# Patient Record
Sex: MALE | Race: WHITE | NOT HISPANIC OR LATINO | Employment: UNEMPLOYED | URBAN - METROPOLITAN AREA
[De-identification: names, ages, dates, MRNs, and addresses within clinical notes are randomized per-mention and may not be internally consistent; named-entity substitution may affect disease eponyms.]

---

## 2022-09-16 ENCOUNTER — TRANSFERRED RECORDS (OUTPATIENT)
Dept: HEALTH INFORMATION MANAGEMENT | Facility: CLINIC | Age: 27
End: 2022-09-16

## 2022-09-16 LAB
CREATININE (EXTERNAL): 0.83 MG/DL (ref 0.7–1.2)
GFR ESTIMATED (EXTERNAL): 125 ML/MIN/1.73M2
GLUCOSE (EXTERNAL): 90 MG/DL (ref 70–99)
POTASSIUM (EXTERNAL): 3.7 MEQ/L (ref 3.4–5.1)
TSH SERPL-ACNC: 1.02 UIU/ML (ref 0.4–3.99)

## 2022-09-17 ENCOUNTER — TELEPHONE (OUTPATIENT)
Dept: BEHAVIORAL HEALTH | Facility: CLINIC | Age: 27
End: 2022-09-17

## 2022-09-17 ENCOUNTER — HOSPITAL ENCOUNTER (INPATIENT)
Facility: HOSPITAL | Age: 27
LOS: 4 days | Discharge: HOME | End: 2022-09-21
Attending: STUDENT IN AN ORGANIZED HEALTH CARE EDUCATION/TRAINING PROGRAM | Admitting: STUDENT IN AN ORGANIZED HEALTH CARE EDUCATION/TRAINING PROGRAM
Payer: MEDICAID

## 2022-09-17 DIAGNOSIS — R45.1 AGITATION: ICD-10-CM

## 2022-09-17 DIAGNOSIS — G47.00 INSOMNIA, UNSPECIFIED TYPE: Primary | ICD-10-CM

## 2022-09-17 DIAGNOSIS — F25.0 SCHIZOAFFECTIVE DISORDER, BIPOLAR TYPE (H): ICD-10-CM

## 2022-09-17 DIAGNOSIS — F41.9 ANXIETY: ICD-10-CM

## 2022-09-17 PROBLEM — F25.9 SCHIZOAFFECTIVE DISORDER (H): Status: ACTIVE | Noted: 2022-09-17

## 2022-09-17 PROCEDURE — 124N000001 HC R&B MH

## 2022-09-17 RX ORDER — OLANZAPINE 10 MG/2ML
10 INJECTION, POWDER, FOR SOLUTION INTRAMUSCULAR 3 TIMES DAILY PRN
Status: DISCONTINUED | OUTPATIENT
Start: 2022-09-17 | End: 2022-09-21 | Stop reason: HOSPADM

## 2022-09-17 RX ORDER — LANOLIN ALCOHOL/MO/W.PET/CERES
3 CREAM (GRAM) TOPICAL
Status: DISCONTINUED | OUTPATIENT
Start: 2022-09-17 | End: 2022-09-21 | Stop reason: HOSPADM

## 2022-09-17 RX ORDER — GABAPENTIN 100 MG/1
100 CAPSULE ORAL EVERY 6 HOURS PRN
Status: DISCONTINUED | OUTPATIENT
Start: 2022-09-17 | End: 2022-09-21 | Stop reason: HOSPADM

## 2022-09-17 RX ORDER — OLANZAPINE 10 MG/1
10 TABLET ORAL 3 TIMES DAILY PRN
Status: DISCONTINUED | OUTPATIENT
Start: 2022-09-17 | End: 2022-09-21 | Stop reason: HOSPADM

## 2022-09-17 ASSESSMENT — ACTIVITIES OF DAILY LIVING (ADL): ADLS_ACUITY_SCORE: 45

## 2022-09-18 PROCEDURE — 250N000013 HC RX MED GY IP 250 OP 250 PS 637: Performed by: NURSE PRACTITIONER

## 2022-09-18 PROCEDURE — 99223 1ST HOSP IP/OBS HIGH 75: CPT | Mod: AI | Performed by: NURSE PRACTITIONER

## 2022-09-18 PROCEDURE — 124N000001 HC R&B MH

## 2022-09-18 RX ORDER — NICOTINE 21 MG/24HR
1 PATCH, TRANSDERMAL 24 HOURS TRANSDERMAL DAILY
Status: DISCONTINUED | OUTPATIENT
Start: 2022-09-18 | End: 2022-09-21 | Stop reason: HOSPADM

## 2022-09-18 RX ORDER — POLYETHYLENE GLYCOL 3350 17 G
2 POWDER IN PACKET (EA) ORAL
Status: DISCONTINUED | OUTPATIENT
Start: 2022-09-18 | End: 2022-09-21 | Stop reason: HOSPADM

## 2022-09-18 RX ORDER — ARIPIPRAZOLE 10 MG/1
10 TABLET ORAL DAILY
Status: DISCONTINUED | OUTPATIENT
Start: 2022-09-19 | End: 2022-09-19

## 2022-09-18 RX ORDER — MAGNESIUM HYDROXIDE/ALUMINUM HYDROXICE/SIMETHICONE 120; 1200; 1200 MG/30ML; MG/30ML; MG/30ML
30 SUSPENSION ORAL EVERY 4 HOURS PRN
Status: DISCONTINUED | OUTPATIENT
Start: 2022-09-18 | End: 2022-09-21 | Stop reason: HOSPADM

## 2022-09-18 RX ORDER — ARIPIPRAZOLE 5 MG/1
5 TABLET ORAL DAILY
Status: DISCONTINUED | OUTPATIENT
Start: 2022-09-18 | End: 2022-09-18

## 2022-09-18 RX ORDER — ACETAMINOPHEN 325 MG/1
650 TABLET ORAL EVERY 4 HOURS PRN
Status: DISCONTINUED | OUTPATIENT
Start: 2022-09-18 | End: 2022-09-21 | Stop reason: HOSPADM

## 2022-09-18 RX ADMIN — NICOTINE 1 PATCH: 21 PATCH, EXTENDED RELEASE TRANSDERMAL at 18:00

## 2022-09-18 RX ADMIN — NICOTINE POLACRILEX 2 MG: 2 GUM, CHEWING BUCCAL at 17:36

## 2022-09-18 RX ADMIN — ARIPIPRAZOLE 5 MG: 5 TABLET ORAL at 12:08

## 2022-09-18 RX ADMIN — NICOTINE POLACRILEX 2 MG: 2 GUM, CHEWING BUCCAL at 14:02

## 2022-09-18 RX ADMIN — OLANZAPINE 10 MG: 10 TABLET, FILM COATED ORAL at 15:48

## 2022-09-18 RX ADMIN — OLANZAPINE 10 MG: 10 TABLET, FILM COATED ORAL at 17:35

## 2022-09-18 RX ADMIN — NICOTINE POLACRILEX 2 MG: 2 GUM, CHEWING BUCCAL at 15:45

## 2022-09-18 ASSESSMENT — ACTIVITIES OF DAILY LIVING (ADL)
ADLS_ACUITY_SCORE: 28
TOILETING_ISSUES: NO
ADLS_ACUITY_SCORE: 28
DIFFICULTY_EATING/SWALLOWING: NO
ORAL_HYGIENE: INDEPENDENT
ADLS_ACUITY_SCORE: 28
ADLS_ACUITY_SCORE: 28
CHANGE_IN_FUNCTIONAL_STATUS_SINCE_ONSET_OF_CURRENT_ILLNESS/INJURY: NO
DRESS: SCRUBS (BEHAVIORAL HEALTH)
ADLS_ACUITY_SCORE: 28
DRESSING/BATHING_DIFFICULTY: NO
CONCENTRATING,_REMEMBERING_OR_MAKING_DECISIONS_DIFFICULTY: NO
ADLS_ACUITY_SCORE: 28
LAUNDRY: UNABLE TO COMPLETE
WEAR_GLASSES_OR_BLIND: NO
ADLS_ACUITY_SCORE: 45
FALL_HISTORY_WITHIN_LAST_SIX_MONTHS: NO
DOING_ERRANDS_INDEPENDENTLY_DIFFICULTY: NO
HYGIENE/GROOMING: INDEPENDENT
ADLS_ACUITY_SCORE: 28
WALKING_OR_CLIMBING_STAIRS_DIFFICULTY: NO

## 2022-09-18 NOTE — PLAN OF CARE
Report received.   PT in MHICU requesting Nicotine gum. When we speak he asked me if a lot of people up here hear voices. We get into a long discussion about his voices and he reports they are intrusive at times and rude and interrupt conversations that he is trying to have with real people. He reports that Zyprexa 20mg has helped him in the past and that he would like to try some today. He is friendly, cooperative, polite and calm.   He takes Zyprexa 10mg per request for AH. 1548.  He weaned appropriately out to open unit to make a phone call and have dinner.   He was social in lounge, elated and hyper-verbal but friendly and appropriate.   He requests a second dose of Zyprexa 10mg at 1735 stating the first dose helped the voices get quieter but would like a second dose to see if it could stop them completely for awhile.   He played bored games in the lobby for a short time and requested to go back to his room and went to into bed at 1815.    He also started a nicotine patch this shift.     Face to face end of shift report communicated to oncoming RN     Milena Kolb RN  9/18/2022  9:50 PM                    Problem: Behavioral Health Plan of Care  Goal: Patient-Specific Goal (Individualization)  Description: Patient will sleep 6-8 hours per night.  Patient will eat >75% of meals.   Patient will be open to treatment team recommendations for medicines and aftercare planning.    Weaning Care Plan: (9/17/22) Patient unable to wean at this time. Treatment team to assess daily.  Outcome: Ongoing, Progressing     Problem: Thought Process Alteration  Goal: Optimal Thought Clarity  Description: Patient will have no self harm outbursts while inpatient.  Patient will maintain ADLs without prompting.   Outcome: Ongoing, Progressing   Goal Outcome Evaluation:

## 2022-09-18 NOTE — PLAN OF CARE
Problem: Behavioral Health Plan of Care  Goal: Patient-Specific Goal (Individualization)  Description: Patient will sleep 6-8 hours per night.  Patient will eat >75% of meals.   Patient will be open to treatment team recommendations for medicines and aftercare planning.    Weaning Care Plan: (9/17/22) Patient unable to wean at this time. Treatment team to assess daily.  Outcome: Ongoing, Progressing   Goal Outcome Evaluation:      Face to face shift report received from RN. Rounding completed, pt observed.Client rested in room for 7 hours with eyes closed and respirations noted. Face to face report will be communicated to oncoming RN.    Eros Jackson RN  9/18/2022  5:39 AM

## 2022-09-18 NOTE — PROGRESS NOTES
09/17/22 1255   Patient Belongings   Did you bring any home meds/supplements to the hospital?  No   Patient Belongings remains with patient;locker   Patient Belongings Remaining with Patient body jewelry  (ring is on right hand)   Patient Belongings Put in Hospital Secure Location (Security or Locker, etc.) clothing   Belongings Search Yes   Clothing Search Yes   Second Staff Lupis   Comment pink striped boxers, black shoes, black shirt, navy blue shirt, pair of striped socks, grey sweatshirt, grey shorts, 2 pens, red stick, black cord       List items sent to safe: none  All other belongings put in assigned cubby in belongings room.       I have reviewed my belongings list on admission and verify that it is correct.     Patient signature_______________________________    Second staff witness (if patient unable to sign) ______________________________       I have received all my belongings at discharge.    Patient signature________________________________    Amee   9/17/2022  11:17 PM

## 2022-09-18 NOTE — PLAN OF CARE
"  Problem: Behavioral Health Plan of Care  Goal: Patient-Specific Goal (Individualization)  Description: Patient will sleep 6-8 hours per night.  Patient will eat >75% of meals.   Patient will be open to treatment team recommendations for medicines and aftercare planning.    Weaning Care Plan: (9/17/22) Patient unable to wean at this time. Treatment team to assess daily.    Patient is isolative and withdrawn, spending time in his room in bed. He is angry, irritable, and frustrated. Speech and eye contact are minimal, he is dismissive and short with his answers. States \"I don't want to be here, I never wanted to come to the hospital\". He requested to have his \"medallion necklace, it's for [employer] reasons\", he was told that this was not allowed, but he could receive a photo copy of it, states \"that's okay, just make sure it's in my stuff\". When given his first dose of Abilify, he states \"I need more than that\", referring to the dose of 5 mg. He showered after lunch.   Face to face end of shift report communicated to oncoming RN.     Deborah Gregory RN  9/18/2022  3:00 PM    Outcome: Ongoing, Not Progressing     Problem: Thought Process Alteration  Goal: Optimal Thought Clarity  Description: Patient will have no self harm outbursts while inpatient.  Patient will maintain ADLs without prompting.   Outcome: Ongoing, Not Progressing   Goal Outcome Evaluation:    Plan of Care Reviewed With: patient                 "
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
## 2022-09-18 NOTE — TELEPHONE ENCOUNTER
[facility] 9/17/22 4:54pm    B Pt BIB police with concerns of hearing voices. Pt reporting AH telling him to kill himself and are causing him distress and will likely commit suicide if voices do not stop. Pt denies previous SA or current SI however crisis team reports pt expressed SI with multiple plans. MH DX schizoaffective; prev [facility] stays, most recent 3-4 months ago. Pt reports prev substance use; THC a few days ago, meth and heroin 3-4 months ago and frequent alcohol use to help him fall asleep at night, no withdrawal symptoms. Utox pos THC, negative for everything else. COVID negative.     A PABLO    R Pt in [facility] ER awaiting placement.     8:35pm: On call [place] provider Connie accepts pt for 5S/N.   9pm: Intake informed 5N of pt placement and report time. Charge nurse report [facility] can call for report whenever ready. Intake informed [facility] of pt placement and report time.

## 2022-09-18 NOTE — PLAN OF CARE
ADMISSION NOTE    Reason for admission: erratic behaviors, self harm.  Safety concerns: history of self harm.  Risk for or history of violence: unknown at this time.   Full skin assessment: completed. No open areas. Tra    Patient arrived on unit from [facility] ED accompanied by ambulance crew and security on 9/17/2022  10:50PM.   Status on arrival: cooperative but sarcastic, disheveled, hungry, affect bright, smiling.  /89   Pulse 99   Temp 98.6  F (37  C) (Temporal)   Resp 16   SpO2 97%   Patient given tour of unit and Welcome to  unit papers given to patient, wanding completed, belongings inventoried, and admission assessment completed.   Patient's legal status on arrival is unknown at this time. Needs to be verified.  Patient Bill of Rights discussed with and copy given to patient.   Patient denies SI, HI, and thoughts of self harm and contracts for safety while on unit.      Maicol Sainz RN  9/17/2022  11:41 PM       Goal Outcome Evaluation:      Problem: Behavioral Health Plan of Care  Goal: Patient-Specific Goal (Individualization)  Description: Patient will sleep 6-8 hours per night.  Patient will eat >75% of meals.   Patient will be open to treatment team recommendations for medicines and aftercare planning.    Weaning Care Plan: (9/17/22) Patient unable to wean at this time. Treatment team to assess daily.  Outcome: Ongoing, Not Progressing     Problem: Thought Process Alteration  Goal: Optimal Thought Clarity  Description: Patient will have no self harm outbursts while inpatient.  Patient will maintain ADLs without prompting.   Outcome: Ongoing, Not Progressing

## 2022-09-19 PROCEDURE — 250N000013 HC RX MED GY IP 250 OP 250 PS 637: Performed by: NURSE PRACTITIONER

## 2022-09-19 PROCEDURE — 250N000011 HC RX IP 250 OP 636: Performed by: NURSE PRACTITIONER

## 2022-09-19 PROCEDURE — 124N000001 HC R&B MH

## 2022-09-19 PROCEDURE — 99232 SBSQ HOSP IP/OBS MODERATE 35: CPT | Performed by: NURSE PRACTITIONER

## 2022-09-19 RX ORDER — LORAZEPAM 2 MG/ML
1 INJECTION INTRAMUSCULAR EVERY 4 HOURS PRN
Status: DISCONTINUED | OUTPATIENT
Start: 2022-09-19 | End: 2022-09-21 | Stop reason: HOSPADM

## 2022-09-19 RX ORDER — ARIPIPRAZOLE 15 MG/1
15 TABLET ORAL DAILY
Status: DISCONTINUED | OUTPATIENT
Start: 2022-09-20 | End: 2022-09-21 | Stop reason: HOSPADM

## 2022-09-19 RX ORDER — LORAZEPAM 1 MG/1
1 TABLET ORAL EVERY 4 HOURS PRN
Status: DISCONTINUED | OUTPATIENT
Start: 2022-09-19 | End: 2022-09-21 | Stop reason: HOSPADM

## 2022-09-19 RX ADMIN — NICOTINE POLACRILEX 2 MG: 2 LOZENGE ORAL at 12:56

## 2022-09-19 RX ADMIN — ARIPIPRAZOLE 10 MG: 10 TABLET ORAL at 08:08

## 2022-09-19 RX ADMIN — OLANZAPINE 10 MG: 10 INJECTION, POWDER, LYOPHILIZED, FOR SOLUTION INTRAMUSCULAR at 14:06

## 2022-09-19 RX ADMIN — GABAPENTIN 100 MG: 100 CAPSULE ORAL at 13:07

## 2022-09-19 RX ADMIN — NICOTINE POLACRILEX 2 MG: 2 LOZENGE ORAL at 17:32

## 2022-09-19 RX ADMIN — NICOTINE POLACRILEX 2 MG: 2 LOZENGE ORAL at 09:08

## 2022-09-19 RX ADMIN — NICOTINE 1 PATCH: 21 PATCH, EXTENDED RELEASE TRANSDERMAL at 08:09

## 2022-09-19 RX ADMIN — OLANZAPINE 10 MG: 10 TABLET, FILM COATED ORAL at 17:33

## 2022-09-19 RX ADMIN — LORAZEPAM 1 MG: 2 INJECTION, SOLUTION INTRAMUSCULAR; INTRAVENOUS at 18:04

## 2022-09-19 RX ADMIN — GABAPENTIN 100 MG: 100 CAPSULE ORAL at 20:38

## 2022-09-19 RX ADMIN — NICOTINE POLACRILEX 2 MG: 2 LOZENGE ORAL at 21:21

## 2022-09-19 RX ADMIN — OLANZAPINE 10 MG: 10 TABLET, FILM COATED ORAL at 07:54

## 2022-09-19 ASSESSMENT — ACTIVITIES OF DAILY LIVING (ADL)
ADLS_ACUITY_SCORE: 28
ADLS_ACUITY_SCORE: 28
LAUNDRY: UNABLE TO COMPLETE
ADLS_ACUITY_SCORE: 28
ADLS_ACUITY_SCORE: 28
HYGIENE/GROOMING: INDEPENDENT
ORAL_HYGIENE: INDEPENDENT
ADLS_ACUITY_SCORE: 28
DRESS: SCRUBS (BEHAVIORAL HEALTH);INDEPENDENT
ADLS_ACUITY_SCORE: 28

## 2022-09-19 NOTE — PROGRESS NOTES
"[facility] PSYCHIATRY  PROGRESS NOTE     SUBJECTIVE     Anand is weaning out of the MHICU when I see him today. He reports that his mood is \"stellar\" today. Mood seems elated, speech a bit loud at times. When speaking with me he reports that the \"voices are better\", though 10 minutes later tells his nurse that they are \"worse\" and is requesting PRN medications. He denies any suicidal thoughts. He reports that he slept well last night. He has been attending some of the group programming. Agreeable to increase in Abilify today.       MEDICATIONS   Scheduled Meds:    [START ON 9/20/2022] ARIPiprazole  15 mg Oral Daily     nicotine  1 patch Transdermal Daily     nicotine   Transdermal Q8H     PRN Meds:.acetaminophen, alum & mag hydroxide-simethicone, gabapentin, melatonin, nicotine, OLANZapine **OR** OLANZapine     ALLERGIES   No Known Allergies     MENTAL STATUS EXAM   Vitals: /86   Pulse 109   Temp 97.8  F (36.6  C) (Temporal)   Resp 18   SpO2 98%     Appearance:  awake, alert, dressed in hospital scrubs, appeared as age stated and slightly unkempt  Attitude:  cooperative  Eye Contact:  good  Mood:  Elated, \"stellar\"  Affect:  mood congruent  Speech:  clear, coherent, loud at times  Psychomotor Behavior:  no evidence of tardive dyskinesia, dystonia, or tics  Thought Process:  linear and goal oriented  Associations:  no loose associations  Thought Content:  no evidence of suicidal ideation or homicidal ideation and auditory hallucinations present  Insight:  limited  Judgment:  limited  Oriented to:  time, person, and place  Attention Span and Concentration:  fair  Recent and Remote Memory:  intact  Language: Able to name objects, Able to repeat phrases and Able to read and write  Fund of Knowledge: appropriate  Muscle Strength and Tone: normal  Gait and Station: Normal       LABS   No results found for this or any previous visit (from the past 24 hour(s)).      IMPRESSION     This is a 26 year old " male with a PMH of schizoaffective disorder, polysubstance abuse, ADHD, unspecified developmental delay, and mild cognitive impairment who presented via law enforcement to the [facility] ED for evaluation of suicidal ideation and hallucinations. He reportedly had been making statements about command hallucinations telling him to kill himself. He indicated that they had been getting worse over the past 3 months. He does not take any medications though does admit to frequent use of cannabis and alcohol, last use of meth and heroin about 3 months ago. Today he openly admits that the voices have been bad, though denies that he made any suicidal statements or that they are telling him to kill himself. He is irritable that he is in the hospital, though has remained cooperative. He offers very little information. He is willing to start medication, as long as it is not Haldol or Risperdal which he reports having side effects from. Discussed Abilify, which he is agreeable to take after reviewing risks, benefits, and side effects. Discussed that his ongoing substance use will continue to complicate the clinical picture until he is able to maintain a prolonged period of sobriety. He does report seeing someone at [facility], though he is unable to identify who he sees or why he is seeing them. Will need to clarify and get him follow up scheduled prior to discharge.       DIAGNOSES     Unspecified psychotic disorder, r/o substance-induced psychosis vs schizoaffective (by history)  Cannabis use disorder, severe  History of polysubstance abuse- opioid (heroin) and stimulant (meth) use disorder- in partial remission  Alcohol use disorder       PLAN     Location: Unit 5  Legal Status: Orders Placed This Encounter      Emergency Hospitalization Hold (72 Hr Hold)    Safety Assessment:    Behavioral Orders   Procedures     Code 1 - Restrict to Unit     Routine Programming     As clinically indicated     Status 15      Every 15 minutes.      PTA medications continued/changed:     -No PTA meds    New medications tried and stopped:     -None    New medications initiated:     -Abilify 5 mg 9/18, increase to 10 mg on 9/19, 15 mg on 9/20    Today's Changes:    -Increase Abilify    Programming: Patient will be treated in a therapeutic milieu with appropriate individual and group therapies. Education will be provided on diagnoses, medications, and treatments.     Medical diagnoses:  Per medicine    Consult: None  Tests: None    Anticipated LOS: 3-5 days  Disposition: home with outpatient services       TREATMENT TEAM CARE PLAN     Progress: Continued symptoms. Elevated mood    Continued Stay Criteria/Rationale: Continued symptoms without sufficient improvement/resolution. Increase Abilify    Medical/Physical: See above.    Precautions: See above.     Plan: Continue inpatient care with unit support and medication management.    Rationale for change in precautions or plan: NA due to no change.    Participants: Mariah Toribio NP, Nursing, SW, OT.    The patient's care was discussed with the treatment team and chart notes were reviewed.       ATTESTATION      Mariah Toribio NP

## 2022-09-19 NOTE — DISCHARGE INSTRUCTIONS
Behavioral Discharge Planning and Instructions    Summary: This is a 26 year old male who presented to the [facility] ED via law enforcement for evaluation of auditory hallucinations and SI.     Main Diagnosis: SI    Health Care Follow-up:     [facility]   [address] [place] [address]   Phone: [phone]     [facility]  [address] [facility], [id number]  [phone]  Fax [phone]     [facility]  [address] [place] [address]  Phone: [phone] Fax: [phone]     NUMBERS TO CALL IN CRISIS    National Suicide Prevention Lifeline ([facility])  1-150.215.7755    Crisis Text Line ([place])  Text  HOME  to 637951    Mental Health Crisis Line ([place], [place])  186.766.4754    [facility] Mobile Crisis ([place], [place])   [phone]      If you are feeling very unsafe, call 911 or bring yourself to the Emergency Room        Counseling in [place]:  Benji Burns           [phone]  [facility]    [facility]      [phone]  [facility]     [facility] (young kids)    [phone]   Jazlyn López [facility] (older kids & adults)  [phone]   Dusty Luciano [facility]      [phone]  [facility]       [phone]   Moiz Oliver MS, LP   Kerry Graham MA, [facility]   Luigi Church MS psychotherapist   Fior Voss MS, [facility]   Diana Armenta [facility], counselor   Ute Oliver [facility], counselor  Damaris        [phone]  Enrique Gunn, counseling  Dr Ann Campa, psychiatry  Asmita Goodman, counseling  Familia Garcia, counseling  Sohail Fonseca, counseling  Connie Shelton, psychiatry   [facility]       [phone]   Mere Patterson MA, LMFT, [facility]   Ameena Spencer MSW, [facility]          [phone]  [facility]      [phone]   Ute Tolentino MS, [facility]          [phone]        J Luis Cooper MSW, [facility] , C-MARTIR Morales  MSW, LGSW                                                    Jose Vences [facility]      Lydia Gary MS, LPC   Nilam Leaders   [facility]                [phone]      Trish Hernandes PsyD     Cyndee DonahueyD, owner      Yessi Dante PsyD    Michoacano Martinez MPAS, WILFREDO Marcus PhD   Vilma Monteiro MSW, LICSW [facility]       [phone]   Kelsi [facility]   Moy Vega APRN, CNP,     Jordan Bullard MSW, [facility] (adolescents)   Jackie Grinnel APRN, CNP (Hib via telehealth; adolescents)   Laura AMIN, CNP (Hib via telehealth)   Elian Mckeno MA, LPC, BCIA (Hib via telehealth)   Dorina Yancey [facility] MSW, SW   Maritza Patel [facility]   Huber Estes DNP, APRN, CNP   Silvana Hernandez RN, BSN   Danna Coburn RN-BC, BSN (Hib via telehealth)  [facility]         [phone]   Jackelyn Rowe, MSN, PMHNP-BC  [facility]           [phone]   Jaya Arnold MA, LMFT   Kayli Valerio MS RN PM NP   Ute Vargas, [facility]         [phone]  [facility]       [phone]   [facility]   Magda Godoy (to be LPC)   Escobar Real (to be [facility])      Counseling in [place]:  [facility]       [phone]   mental health & CD counseling  Elian Schilling       [phone]  [facility]       [phone]  [facility]        [phone]   Mere Morton  [facility]       [employer] [facility] [facility]              [phone]   can do weekends and evenings   DeLyesenia Oneil, MSW, LIC, LCSW, CCTP    Ben Salcedo MA, LMFT, [facility]  Ravenna Blue Laura       evenings, weekends  [phone]      Counseling in [place]:  [facility]         [phone]   Jackelyn Rowe, MSN, PMHNP-BC   Dottie Montoya MA, [facility]  [facility]      [phone]  [facility]       [phone]   Ana Cristina Wiggins [facility]  [facility]       Brandi  "Jewell       [phone]  [facility]    [address]ally[address]     [facility]      [phone]  [facility]     Familia Cohen MS Ed, [facility]    [phone]    (kids) denotes providers who also see kids   Attend all scheduled appointments with your outpatient providers. Call at least 24 hours in advance if you need to reschedule an appointment to ensure continued access to your outpatient providers.     Major Treatments, Procedures and Findings:  You were provided with: a psychiatric assessment, assessed for medical stability, medication evaluation and/or management, group therapy, family therapy, individual therapy, CD evaluation/assessment, milieu management, and medical interventions    Symptoms to Report: feeling more aggressive, increased confusion, losing more sleep, mood getting worse, or thoughts of suicide    Early warning signs can include: increased depression or anxiety sleep disturbances increased thoughts or behaviors of suicide or self-harm  increased unusual thinking, such as paranoia or hearing voices        Resources:   Crisis Intervention: 879.182.7865 or 349-576-0676 (TTY: 993.127.1002).  Call anytime for help.  National [place] on Mental Illness (www.mn.ivania.org): 151.320.5369 or 758-714-0893.  [place] Association for Children's Mental Health (www.macmh.org): 492.414.5002.  Alcoholics Anonymous (www.alcoholics-anonymous.org): Check your phone book for your local chapter.  Suicide Awareness Voices of Education (SAVE) (www.save.org): 408-579-SBMS (4999)  National Suicide Prevention Line (www.mentalhealthmn.org): 407-892-IXNK (5589)  Mental Health Consumer/Survivor Network of [place] (www.mhcsn.net): 997.471.6175 or 146-536-0608  Mental Health Association of [place] (www.mentalhealth.org): 350.219.7310 or 009-541-6724  Self- Management and Recovery Training., SMART-- Toll free: 887.214.5153  www.ipDatatel.Try The World  Text 4 Life: txt \"LIFE\" to 28275 for immediate support and crisis " "intervention  Crisis text line: Text \"[place]\" to 470247. Free, confidential, 24/7.  Crisis Intervention: 771.717.8486 or 072-974-2618. Call anytime for help.     General Medication Instructions:   See your medication sheet(s) for instructions.   Take all medicines as directed.  Make no changes unless your doctor suggests them.   Go to all your doctor visits.  Be sure to have all your required lab tests. This way, your medicines can be refilled on time.  Do not use any drugs not prescribed by your doctor.  Avoid alcohol.    Advance Directives:   Scanned document on file with [facility]? No scanned doc  Is document scanned? Pt states no documents  Honoring Choices Your Rights Handout: Informed and given  Was more information offered? Pt declined    The Treatment team has appreciated the opportunity to work with you. If you have any questions or concerns about your recent admission, you can contact the unit which can receive your call 24 hours a day, 7 days a week. They will be able to get in touch with a Provider if needed. The unit number is [phone] .    [place] Area:  [facility], Crisis stabilization Hospitals in [place]- [phone]  Pending sale to [facility] Crisis Line: 1-484.471.4612  [facility] For Family [facility]: 521-1511  Sexual Assault Program [facility]: 366.539.4449 or 1-989.324.2198  [facility] Battered Women's Program: 7-960-708-6102 Ext: 279       Calls answered Mon-Fri-8:00 am--4:30 pm    [place]:  [facility]: 9-891-864-5946  [facility] - 2-313-674-1579  [facility] first call for help: 0-147-784-3392  [facility]:  (382) [phone]    [place] Area:  Warm Line: 1-108.627.3085       Calls answered Tuesday--Saturday 4:00 pm--10:00 pm  [place] Crisis Line - 854.852.5409  [facility] 369-211-7823    [place] Statewide:  [place] Crisis and Referral Services: 1-126.177.4436  National Suicide Prevention Lifeline: 5-192-965-TALK (1749)   - hbv1hqhe- Text \"Life\" to " 96081  First Call for Help: 2-1-1  [place] Helpline- 0-640-FVOQ-HELP   Crisis Text Line: Text  [place]  to 998400
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
## 2022-09-19 NOTE — PLAN OF CARE
Problem: Behavioral Health Plan of Care  Goal: Plan of Care Review  Recent Flowsheet Documentation  Taken 9/19/2022 1000 by Mike Douglas RN  Plan of Care Reviewed With: patient  Patient Agreement with Plan of Care: agrees  Goal: Patient-Specific Goal (Individualization)  Description: Patient will sleep 6-8 hours per night.  Patient will eat >75% of meals.   Patient will be open to treatment team recommendations for medicines and aftercare planning.    Weaning Care Plan: Patient is able to wean to the open unit as long as his behavior remains appropriate. Treatment team to assess daily.  Outcome: Ongoing, Progressing  Note: Patient is up on the unit. He is maintaining appropriate boundaries with staff and peers. He admits to feeling anxious. He has auditory hallucinations that are very intrusive. He took zyprexa 10 mg orally at  0754 for increased hallucinations. He weaned to the open unit and then his room was moved to the open unit. He is agreeable to safe behavior. He attended groups.   Goal: Optimal Comfort and Wellbeing  Intervention: Provide Person-Centered Care  Recent Flowsheet Documentation  Taken 9/19/2022 1000 by Mike Douglas RN  Trust Relationship/Rapport:   questions answered   care explained   questions encouraged   reassurance provided   thoughts/feelings acknowledged  Goal: Develops/Participates in Therapeutic [place] to Support Successful Transition  Intervention: Foster Therapeutic [place]  Recent Flowsheet Documentation  Taken 9/19/2022 1000 by Mike Douglas RN  Trust Relationship/Rapport:   questions answered   care explained   questions encouraged   reassurance provided   thoughts/feelings acknowledged     Problem: Thought Process Alteration  Goal: Optimal Thought Clarity  Description: Patient will have no self harm outbursts while inpatient.  Patient will maintain ADLs without prompting.   Outcome: Ongoing, Progressing  Note: He continues with intrusive hallucinations. He received zyprexa  10 mg IM at 1406. He is spending time in the lounge and maintains appropriate boundaries.

## 2022-09-19 NOTE — PLAN OF CARE
Problem: Behavioral Health Plan of Care  Goal: Patient-Specific Goal (Individualization)  Description: Patient will sleep 6-8 hours per night.  Patient will eat >75% of meals.   Patient will be open to treatment team recommendations for medicines and aftercare planning.    Weaning Care Plan: (9/17/22) Patient unable to wean at this time. Treatment team to assess daily.  Outcome: Ongoing, Progressing   Goal Outcome Evaluation:      Face to face shift report received from RN. Rounding completed, pt observed.Client rested in room for 7 hours with eyes closed and respirations noted.Face to face report will be communicated to oncoming RN.    Eros Jackson RN  9/19/2022  6:31 AM

## 2022-09-19 NOTE — CARE PLAN
Nursing completed review with patient who reported no medications PTA. Will not be speaking with patient unless otherwise requested.     Unable to find any conflicting data from any available outside sources. Pt likely not on any prescribed medications at this time.     Will remain available if anything arises regarding PTA medications.    Carmen Leija on 9/19/2022 at 12:04 PM

## 2022-09-20 VITALS
TEMPERATURE: 98.4 F | SYSTOLIC BLOOD PRESSURE: 139 MMHG | RESPIRATION RATE: 16 BRPM | OXYGEN SATURATION: 98 % | HEART RATE: 108 BPM | DIASTOLIC BLOOD PRESSURE: 82 MMHG

## 2022-09-20 PROCEDURE — 250N000013 HC RX MED GY IP 250 OP 250 PS 637: Performed by: NURSE PRACTITIONER

## 2022-09-20 PROCEDURE — 124N000001 HC R&B MH

## 2022-09-20 PROCEDURE — 99232 SBSQ HOSP IP/OBS MODERATE 35: CPT | Performed by: NURSE PRACTITIONER

## 2022-09-20 RX ADMIN — OLANZAPINE 10 MG: 10 TABLET, FILM COATED ORAL at 18:17

## 2022-09-20 RX ADMIN — NICOTINE POLACRILEX 2 MG: 2 LOZENGE ORAL at 16:06

## 2022-09-20 RX ADMIN — NICOTINE 1 PATCH: 21 PATCH, EXTENDED RELEASE TRANSDERMAL at 08:35

## 2022-09-20 RX ADMIN — NICOTINE POLACRILEX 2 MG: 2 LOZENGE ORAL at 18:18

## 2022-09-20 RX ADMIN — NICOTINE POLACRILEX 2 MG: 2 LOZENGE ORAL at 19:30

## 2022-09-20 RX ADMIN — OLANZAPINE 10 MG: 10 TABLET, FILM COATED ORAL at 16:06

## 2022-09-20 RX ADMIN — MELATONIN 3 MG: 3 TAB ORAL at 20:36

## 2022-09-20 RX ADMIN — GABAPENTIN 100 MG: 100 CAPSULE ORAL at 20:36

## 2022-09-20 RX ADMIN — OLANZAPINE 10 MG: 10 TABLET, FILM COATED ORAL at 08:35

## 2022-09-20 RX ADMIN — ARIPIPRAZOLE 15 MG: 15 TABLET ORAL at 08:35

## 2022-09-20 ASSESSMENT — ACTIVITIES OF DAILY LIVING (ADL)
ADLS_ACUITY_SCORE: 28
ORAL_HYGIENE: INDEPENDENT
DRESS: INDEPENDENT;SCRUBS (BEHAVIORAL HEALTH)
ADLS_ACUITY_SCORE: 28
HYGIENE/GROOMING: INDEPENDENT
ADLS_ACUITY_SCORE: 28
LAUNDRY: UNABLE TO COMPLETE
ADLS_ACUITY_SCORE: 28

## 2022-09-20 NOTE — PROGRESS NOTES
"[facility] PSYCHIATRY  PROGRESS NOTE     SUBJECTIVE     Anand is resting in bed when I see him today. He reports feeling \"tired\" today. When asked about the auditory hallucinations he indicates that they are \"not too bad\" today. He denies any suicidal thoughts. He does appear calmer today, mood not as elevated. Denies any side effects from medications. He reports that he slept \"okay\" last night and just wants to sleep now. He notes that he plans on going to stay with his grandmother on discharge, states he has been staying with her off and on over the last month though needs to work on finding his own place. Will likely discharge when hold expires to follow up with his outpatient providers at [facility].        MEDICATIONS   Scheduled Meds:    ARIPiprazole  15 mg Oral Daily     nicotine  1 patch Transdermal Daily     nicotine   Transdermal Q8H     PRN Meds:.acetaminophen, alum & mag hydroxide-simethicone, gabapentin, LORazepam **OR** LORazepam, melatonin, nicotine, OLANZapine **OR** OLANZapine     ALLERGIES   No Known Allergies     MENTAL STATUS EXAM   Vitals: /85   Pulse 95   Temp 98.5  F (36.9  C) (Tympanic)   Resp 16   SpO2 98%     Appearance:  awake, alert, dressed in hospital scrubs, appeared as age stated and slightly unkempt  Attitude:  cooperative  Eye Contact:  good  Mood: calmer, less elevated today  Affect:  mood congruent  Speech:  clear, coherent  Psychomotor Behavior:  no evidence of tardive dyskinesia, dystonia, or tics  Thought Process:  linear and goal oriented  Associations:  no loose associations  Thought Content: denies any SI, reports that auditory hallucinations are \"not too bad\"  Insight:  limited  Judgment:  limited  Oriented to:  time, person, and place  Attention Span and Concentration:  fair  Recent and Remote Memory:  intact  Language: Able to name objects, Able to repeat phrases and Able to read and write  Fund of Knowledge: appropriate for education  Muscle " Strength and Tone: normal  Gait and Station: Normal       LABS   No results found for this or any previous visit (from the past 24 hour(s)).      IMPRESSION     This is a 26 year old male with a PMH of schizoaffective disorder, polysubstance abuse, ADHD, unspecified developmental delay, and mild cognitive impairment who presented via law enforcement to the [facility] ED for evaluation of suicidal ideation and hallucinations. He reportedly had been making statements about command hallucinations telling him to kill himself. He indicated that they had been getting worse over the past 3 months. He does not take any medications though does admit to frequent use of cannabis and alcohol, last use of meth and heroin about 3 months ago. Today he openly admits that the voices have been bad, though denies that he made any suicidal statements or that they are telling him to kill himself. He is irritable that he is in the hospital, though has remained cooperative. He offers very little information. He is willing to start medication, as long as it is not Haldol or Risperdal which he reports having side effects from. Discussed Abilify, which he is agreeable to take after reviewing risks, benefits, and side effects. Discussed that his ongoing substance use will continue to complicate the clinical picture until he is able to maintain a prolonged period of sobriety. He does report seeing someone at [facility], though he is unable to identify who he sees or why he is seeing them. Will need to clarify and get him follow up scheduled prior to discharge.       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      PTA medications continued/changed:     -No PTA meds    New medications tried and stopped:     -None    New medications initiated:     -Abilify 5 mg 9/18, increase to 10 mg on 9/19, 15 mg on 9/20      Today's Changes:  -Continue Abilify      Programming: Patient will be treated in a therapeutic milieu with appropriate individual and group therapies. Education will be provided on diagnoses, medications, and treatments.     Medical diagnoses:  Per medicine    Consult: None  Tests: None    Anticipated LOS: 3-5 days. Likely discharge home when hold expires  Disposition: home with outpatient services- [facility]       ATTESTATION      Mariah oTribio NP

## 2022-09-20 NOTE — PLAN OF CARE
"SHIFT SUMMARY:  PRN IM ativan 1 mg in the right deltoid related to agitation/anxiety from hearing voices - \"sick shit, I don't even wanna talk about it.\" Attended group and painted. Full-range affect. Pressured speech. Compliant with medication administration and asks for PRNs appropriately.  Weaning to the open unit. Denies suicidal ideation and pain.      Problem: Thought Process Alteration  Goal: Optimal Thought Clarity  Description: Patient will have no self harm outbursts while inpatient.  Patient will maintain ADLs without prompting.   Outcome: Ongoing, Not Progressing     Problem: Behavioral Health Plan of Care  Goal: Patient-Specific Goal (Individualization)  Description: Patient will sleep 6-8 hours per night.  Patient will eat >75% of meals.   Patient will be open to treatment team recommendations for medicines and aftercare planning.    Weaning Care Plan: Patient is able to wean to the open unit as long as his behavior remains appropriate. Treatment team to assess daily.  Outcome: Ongoing, Progressing  Goal: Absence of New-Onset Illness or Injury  Outcome: Ongoing, Progressing   Goal Outcome Evaluation:                      "

## 2022-09-20 NOTE — PLAN OF CARE
Face to face shift report received from YOLANDA Cooney.       Problem: Behavioral Health Plan of Care  Goal: Patient-Specific Goal (Individualization)  Description: Patient will sleep 6-8 hours per night.  Patient will eat >75% of meals.   Patient will be open to treatment team recommendations for medicines and aftercare planning.    Weaning Care Plan: Patient is able to wean to the open unit as long as his behavior remains appropriate. Treatment team to assess daily.  Outcome: Ongoing, Not Progressing   Room remains in MHICU due to floor needs. Weaned to open unit this shift without issue. Calm and cooperative. Medication compliant. Reports auditory hallucinations, but declines to describe these. Received Zyprexa 10mg PO with AM medications. Animated but pleasant during conversation. Withdrawn to bed majority of shift. Requested items for a shower, but then did not shower. No reports of pain.     Problem: Thought Process Alteration  Goal: Optimal Thought Clarity  Description: Patient will have no self harm outbursts while inpatient.  Patient will maintain ADLs without prompting.   Outcome: Ongoing, Not Progressing         Face to face end of shift report to be communicated to oncoming RN.

## 2022-09-20 NOTE — PLAN OF CARE
Problem: Behavioral Health Plan of Care  Goal: Patient-Specific Goal (Individualization)  Description: Patient will sleep 6-8 hours per night.  Patient will eat >75% of meals.   Patient will be open to treatment team recommendations for medicines and aftercare planning.    Weaning Care Plan: Patient is able to wean to the open unit as long as his behavior remains appropriate. Treatment team to assess daily.  Outcome: Ongoing, Progressing   Goal Outcome Evaluation:      Face to face shift report received from RN. Rounding completed, pt observed.Client rested in room for 6 hours with eyes closed and respirations noted.Face to face report will be communicated to oncoming RN.    Eros Jackson RN  9/20/2022  6:51 AM

## 2022-09-21 PROCEDURE — 250N000013 HC RX MED GY IP 250 OP 250 PS 637: Performed by: NURSE PRACTITIONER

## 2022-09-21 PROCEDURE — 99239 HOSP IP/OBS DSCHRG MGMT >30: CPT | Performed by: NURSE PRACTITIONER

## 2022-09-21 RX ORDER — ARIPIPRAZOLE 15 MG/1
15 TABLET ORAL DAILY
Qty: 30 TABLET | Refills: 1 | Status: SHIPPED | OUTPATIENT
Start: 2022-09-22

## 2022-09-21 RX ADMIN — ARIPIPRAZOLE 15 MG: 15 TABLET ORAL at 08:20

## 2022-09-21 RX ADMIN — NICOTINE 1 PATCH: 21 PATCH, EXTENDED RELEASE TRANSDERMAL at 08:20

## 2022-09-21 RX ADMIN — OLANZAPINE 10 MG: 10 TABLET, FILM COATED ORAL at 11:15

## 2022-09-21 RX ADMIN — NICOTINE POLACRILEX 2 MG: 2 LOZENGE ORAL at 14:41

## 2022-09-21 ASSESSMENT — ACTIVITIES OF DAILY LIVING (ADL)
ADLS_ACUITY_SCORE: 28

## 2022-09-21 NOTE — PROGRESS NOTES
Pt is discharging at the recommendation of the treatment team. Pt is discharging to home transported by his friend. Pt denies having any thoughts of hurting themself or anyone else. Pt denies anxiety or depression. Due to the patient not having active health insurance, [facility] provided the patient with outpatient resources that include PCP, medication management, and therapy. Discharge instructions, including; demographic sheet, psychiatric evaluation, discharge summary, and AVS were faxed to these next level of care providers.    SW spoke to the patient and explained the importance of following up with patient financial in regards to their health insurance and to schedule appointments.

## 2022-09-21 NOTE — PLAN OF CARE
"SHIFT SUMMARY:   Weaning from the MHICU the majority of the shift. Denies suicidal ideation. Experiences auditory hallucinations, but will not describe what they are saying beyond \"sick shit\". Eating at least 75% of meals, following treatment team recommendation, no self-harm, and maintaining ADLs. Maintains appropriate boundaries with staff and peers. Flat affect.     PRNs:  PO zyprexa 10 mg related to agitation from auditory hallucinations. PO gabapentin and melatonin administered at 20:36 related to anxiety and sleep promotion.      Problem: Behavioral Health Plan of Care  Goal: Patient-Specific Goal (Individualization)  Description: Patient will sleep 6-8 hours per night.  Patient will eat >75% of meals.   Patient will be open to treatment team recommendations for medicines and aftercare planning.    Weaning Care Plan: Patient is able to wean to the open unit as long as his behavior remains appropriate. Treatment team to assess daily.  Outcome: Ongoing, Progressing  Goal: Absence of New-Onset Illness or Injury  Outcome: Ongoing, Progressing     Problem: Thought Process Alteration  Goal: Optimal Thought Clarity  Description: Patient will have no self harm outbursts while inpatient.  Patient will maintain ADLs without prompting.   Outcome: Ongoing, Progressing   Goal Outcome Evaluation:                      "

## 2022-09-21 NOTE — PROGRESS NOTES
Discharge Note    Patient Discharged to home on 9/21/2022 3:08 PM via ambulation to friends house accompanied by staff to exit hospital.     Patient informed of discharge instructions in AVS. patient verbalizes understanding and denies having any questions pertaining to AVS. Patient stable at time of discharge. Patient denies SI, HI, and thoughts of self harm at time of discharge. All personal belongings returned to patient. Discharge prescriptions sent to [facility]  via electronic communication. Psych evaluation, history and physical, AVS, and discharge summary available to next level of care.     Mike Douglas RN  9/21/2022  3:08 PM

## 2022-09-21 NOTE — PLAN OF CARE
Problem: Behavioral Health Plan of Care  Goal: Plan of Care Review  Recent Flowsheet Documentation  Taken 9/21/2022 1000 by Mike Douglas RN  Plan of Care Reviewed With: patient  Patient Agreement with Plan of Care: agrees  Goal: Patient-Specific Goal (Individualization)  Description: Patient will sleep 6-8 hours per night.  Patient will eat >75% of meals.   Patient will be open to treatment team recommendations for medicines and aftercare planning.    Weaning Care Plan: Patient is able to wean to the open unit as long as his behavior remains appropriate. Treatment team to assess daily.  Outcome: Ongoing, Progressing  Note: He is anticipating discharge this afternoon. He is anxious. He continues with auditory hallucinations.  He is maintaining appropriate boundaries with staff and peers. He is taking medications as prescribed.    Goal: Optimal Comfort and Wellbeing  Intervention: Provide Person-Centered Care  Recent Flowsheet Documentation  Taken 9/21/2022 1000 by Mike Douglas RN  Trust Relationship/Rapport:   care explained   questions answered   questions encouraged   reassurance provided   thoughts/feelings acknowledged  Goal: Develops/Participates in Therapeutic [place] to Support Successful Transition  Intervention: Foster Therapeutic [place]  Recent Flowsheet Documentation  Taken 9/21/2022 1000 by Mike Douglas RN  Trust Relationship/Rapport:   care explained   questions answered   questions encouraged   reassurance provided   thoughts/feelings acknowledged     Problem: Thought Process Alteration  Goal: Optimal Thought Clarity  Description: Patient will have no self harm outbursts while inpatient.  Patient will maintain ADLs without prompting.   Outcome: Ongoing, Progressing  Note: He continues with auditory hallucinations. He requested and received zyprexa 10 mg at 1115 with good results.

## 2022-09-21 NOTE — PLAN OF CARE
Problem: Behavioral Health Plan of Care  Goal: Patient-Specific Goal (Individualization)  Description: Patient will sleep 6-8 hours per night.  Patient will eat >75% of meals.   Patient will be open to treatment team recommendations for medicines and aftercare planning.    Weaning Care Plan: Patient is able to wean to the open unit as long as his behavior remains appropriate. Treatment team to assess daily.  Outcome: Ongoing, Progressing   Goal Outcome Evaluation:      Face to face shift report received from RN. Rounding completed, pt observed.Client rested in room for 6 hours with eyes closed and respirations noted.Face to face report will be communicated to oncoming RN.    Eros Jackson RN  9/21/2022  6:33 AM

## 2022-09-23 NOTE — DISCHARGE SUMMARY
"[facility] PSYCHIATRY  DISCHARGE SUMMARY     DISCHARGE DATA     Anand Tidwell MRN# [id number]   Age: 26 year old YOB: 1995     Date of Admission:  9/17/2022  Date of Discharge:  September 21, 2022  Discharge Provider:  Augustina Grossman NP       REASON FOR ADMISSION          This is a 26 year old male who presented to the [facility] ED via law enforcement for evaluation of auditory hallucinations and SI. Patient reported auditory hallucinations telling him to kill himself and they are causing him distress and he will likely commit suicide if voices do no stop. Patient had initially gone to the ED in early AM on 9/16 with complaints of \"earwigs\", further assessment unrevealing and patient discharged home. He was brought back later in the day by police from [facility] where he had presented agitated and yelling. Patient was accepted to behavioral health for further evaluation and treatment.      Per ED notes:  Patient states \"I was at the mental clinic and like the voices in my head were getting to me\". He shares that for the past 3 months the voices he has been hearing have been \"really intense and really negative\". When asked if the voices ever tell him to do anything, patient states \"sometimes they tell me to do things but most of the time they just embarrass me. They dig up my memories and embarrass me\". When asked what kinds of things they do that embarrass him, patient states \"tell the police all the things I am doing such as trespassing, smoking weed, stealing a ride on a frieght train, saying I have meth in my pocket and am selling meth to little kids\". He notes that sometimes he does say these thoughts out loud noting that the voices \"override my thoughts. They put it in my brain\". He reports that when he was at the clinic today he started crying and yelling noting \"I do not have a lot of patients these days\". Patient notes the voices began approximately 2 years " "ago following the ingestion of LSD on his birthday as well as meditating. He reports that shortly after that he began hearing voices.    Patient expresses distress regarding the intensity of his voices. He reports that \"if the voices do not stop there is a high chance of me attempting suicide\". He reports that \"when I tell them to stop or I will commit suicde, they say do it do it, we will resurrect you and bring you back and do this to you beyond the grave\". Patient denies any previous suicide attempts. Patient states \"I just do not want to go there\" when asked if he has thought about plans for suicide. Collateral information gathered from the crisis response team notes that patient has stated that he would slit his throat or hitchhike to [place] and jump [place]. He had previously told members from the CRT team that he has hitchhiked to [place] before with the goal of suiciding. He notes that he recently started a medication that starts with a 'C' and expresses frustration that it has not \"done anything\" yet.    Collateral information gathered from [facility]  Renetta reports she received a call from nursing staff at [facility] that patient was struggling in their office. She shares that they have been working with him throughout the week as he was reporting that he was experieincing SI with multiple plans. She notes that his plans for suicide include slitting his wrists in the bathtub or 2 hitchhike to [place] and [place]. Renetta shares that patient reports that he has attempted this before. Patient reported to her that he is sleeping 2-3 hours per night for the last few months due to intensity of voices and has been self medicating with alcohol. She shares the patient recently started a new medication and has taken his first dose yesterday. She shares that she assessed him yesterday due to suicidal ideation and felt it was safe to " "discharged with a safety plan with the understanding that he would come back today for check-in. She reports that patient has said that if meds do not work that he would attempt suicide. She notes that when patient arrived to [facility] today patient was yelling and more agitated today than yesterday so they were called. When she arrived patient was yellng that voices would not stop or shut up and began to bang his head very hard against the wall. She shares that they had to call [employer] to get him to the hospital. Renetta notes that patient's symptoms have been increasing in intensity. She feels patient has been appearing more hopeless as the week has progressed and has expressed that he is not willing to follow up with resources. He stated to Renetta \"if the meds don't work , I am just going to kill myself and that is it\". She shares that to the best of her knowledge patient has been homeless for 5 years prior to staying with [facility] since released from alf.    Collateral information gathered from [facility]   Julia agreed with everything that Renetta said as the 2 of them were in the room together when this writer called for collateral information. Julia added that patient has said that he thinks about killing himself every single day. She feels he is at a high risk of following through with that plan. [place] 90 year old gr[place]     Today patient is found resting in bed. He presents as somewhat irritable and dismissive, however is cooperative and answers all of my questions, albeit very briefly. When asked about stressors he replies \"everything is stressful\", though declines to elaborate any further. He does state that he was at [facility] earlier in the day, though he denies that he made any suicidal statements. He is upset that he is in the hospital. When asked if he is having any auditory hallucinations he replies \"there as bad as always\". In review of records it appears that he " "began to hear voices about 2 years ago, shortly after using LSD on his birthday. They have been ongoing but have gotten worse in the past 3 months. He reports he has only slept a few hours in the past few days. He is denying any suicidal thoughts today and denies that he made any sort of suicidal statement to anyone. He reports he recently started a medication though he has no recollection of what this medication is or what it was for. He does admit to drinking alcohol nightly to help fall asleep, though denies withdrawal. He also frequently uses marijuana. Reported last use of heroin and methamphetamine as 3-4 months ago. Drug screen on admit it positive for cannabis only. When asked if he has been on medication in the past he replies \"yes\" though would like to try \"something different\". When prompted he does identify having taken Haldol \"TD\" and Risperdal \"gynecomastia\" and does not want either. He reports Zyprexa as \"sorta\" helpful. Discussed other options, patient is agreeable to try Abilify today, does not believe he has tried this in the past. Reviewed risks, benefits, and side effects and neuroleptic consent form signed.      DISCHARGE DIAGNOSIS     Schizoaffective disorder, bipolar type, exacerbated by polysubstance abuse including hallucinogenic's  Hallucinogenic use disorder severe       CONSULTS     None needed at this time       HOSPITAL COURSE     Legal status: None    Patient was admitted to unit 5 due to the aforementioned presentation. The patient was placed under 15 minute checks to ensure patient safety. The patient participated in unit programming and groups as able.    The following changes to the patient's prior to admission medications were made: While he was here Abilify was restarted and titrated back up to 15 mg.  He had been on it in the past and tolerated it well.  He reported he did not have any suicidal thoughts or thoughts to harm anyone else throughout his stay.  He was fairly pleasant " and cooperative and did not pose a threat to himself or other people.  He did state that he was willing to follow-up with [facility].  He was not interested in chemical dependency treatment though does not state he uses large amounts of acid and enjoys using it.  He does not plan on discontinuing his hallucinogenic use and we had a very long discussion about how this is likely affecting his mental health severely.  He still has some delusions and some Buddhist preoccupation though this is decreased significantly.  He is no longer having any paranoid thoughts.. The following medication changes were made  during hospitalization:     With these changes and supports the patient noticed improvement in their symptoms and felt sufficiently ready for discharge. As a result, Anand Tidwell was discharged. At the time of discharge, Anand Tidwell was determined to not be a danger to self or others. At the current time of discharge, the patient does not meet criteria for involuntary hospitalization. On the day of discharge, the patient reports that they do not have suicidal or homicidal ideation. Steps taken to minimize risk include: assessing patient s behavior and thought process daily during hospital stay, discharging patient with adequate plan for follow up for mental and physical health and discussing safety plan of returning to the hospital should the patient ever have thoughts of harming themselves or others. Therefore, based on all available evidence including the factors cited above, the patient does not appear to be at imminent risk for self-harm, and is appropriate for outpatient level of care. However, if patient uses substances or is medication non-adherent, their risk of decompensation and SI will be elevated. This was discussed with the patient.       DISCHARGE MEDICATIONS     Discharge Medication List as of 9/21/2022  2:46 PM      START taking these medications    Details   ARIPiprazole  "(ABILIFY) 15 MG tablet Take 1 tablet (15 mg) by mouth daily, Disp-30 tablet, R-1, E-Prescribe           Reason for two or more neuroleptics:        MENTAL STATUS EXAM     Vitals: /82   Pulse 108   Temp 98.4  F (36.9  C) (Temporal)   Resp 16   SpO2 98%     Appearance: Alert, oriented, dressed in hospital scrubs, appears stated age   Attitude: Cooperative   Eye Contact: Good  Mood: \"Better\"  Affect: Full range of affect  Speech: Normal rate and rhythm   Psychomotor Behavior: No tremor, rigidity, or psychomotor abnormality   Thought Process: Logical, goal directed   Associations: No loose associations   Thought Content: Denies SI or plan. No SIB. Denies A/V hallucinations. No evidence of delusional thought.  Insight: Good  Judgment: Good  Oriented to: Person, place, and time  Attention Span and Concentration: Intact  Recent and Remote Memory: Intact  Language: English with appropriate syntax and vocabulary  Fund of Knowledge: Average  Muscle Strength and Tone: Grossly normal  Gait and Station: Grossly normal       DISCHARGE PLAN     1.  Education given regarding diagnostic and treatment options with risks, benefits and alternatives with adequate verbalization of understanding.  2.  Discharge to home. Upon detailed review of risk factors, patient amenable for release.   3.  Continue aforementioned medications and associated medication changes with follow-up by outpatient provider.  4.  Crisis management planning in place.    5.  Nursing and  to review further discharge recommendations.   6.  Patient is being discharged to home with the following appointments as detailed below.           DISCHARGE SERVICES PROVIDED     40 minutes spent on discharge services, including:  Final examination of patient.  Review and discussion of hospital stay.  Instructions for continued outpatient care/goals.  Preparation of discharge records.  Preparation of medications refills and new prescriptions.  Preparation " of applicable referral forms.        ATTESTATION     Augustina Grossman NP    This note was created with help of Dragon dictation system. Grammatical / typing errors are not intentional.